# Patient Record
Sex: MALE | Race: OTHER | NOT HISPANIC OR LATINO | URBAN - METROPOLITAN AREA
[De-identification: names, ages, dates, MRNs, and addresses within clinical notes are randomized per-mention and may not be internally consistent; named-entity substitution may affect disease eponyms.]

---

## 2021-07-24 ENCOUNTER — EMERGENCY (EMERGENCY)
Facility: HOSPITAL | Age: 41
LOS: 1 days | Discharge: HOME | End: 2021-07-24
Attending: EMERGENCY MEDICINE
Payer: MEDICAID

## 2021-07-24 VITALS
SYSTOLIC BLOOD PRESSURE: 202 MMHG | TEMPERATURE: 98 F | DIASTOLIC BLOOD PRESSURE: 132 MMHG | WEIGHT: 139.99 LBS | RESPIRATION RATE: 18 BRPM | HEART RATE: 90 BPM | HEIGHT: 77 IN

## 2021-07-24 VITALS
OXYGEN SATURATION: 100 % | HEART RATE: 91 BPM | DIASTOLIC BLOOD PRESSURE: 111 MMHG | RESPIRATION RATE: 16 BRPM | SYSTOLIC BLOOD PRESSURE: 161 MMHG

## 2021-07-24 LAB
ALBUMIN SERPL ELPH-MCNC: 4.8 G/DL — SIGNIFICANT CHANGE UP (ref 3.3–5)
ALP SERPL-CCNC: 86 U/L — SIGNIFICANT CHANGE UP (ref 40–120)
ALT FLD-CCNC: 33 U/L — SIGNIFICANT CHANGE UP (ref 10–45)
ANION GAP SERPL CALC-SCNC: 13 MMOL/L — SIGNIFICANT CHANGE UP (ref 5–17)
AST SERPL-CCNC: 42 U/L — HIGH (ref 10–40)
BASOPHILS # BLD AUTO: 0.04 K/UL — SIGNIFICANT CHANGE UP (ref 0–0.2)
BASOPHILS NFR BLD AUTO: 0.6 % — SIGNIFICANT CHANGE UP (ref 0–2)
BILIRUB SERPL-MCNC: 0.5 MG/DL — SIGNIFICANT CHANGE UP (ref 0.2–1.2)
BUN SERPL-MCNC: 9 MG/DL — SIGNIFICANT CHANGE UP (ref 7–23)
CALCIUM SERPL-MCNC: 10 MG/DL — SIGNIFICANT CHANGE UP (ref 8.4–10.5)
CHLORIDE SERPL-SCNC: 100 MMOL/L — SIGNIFICANT CHANGE UP (ref 96–108)
CO2 SERPL-SCNC: 25 MMOL/L — SIGNIFICANT CHANGE UP (ref 22–31)
CREAT SERPL-MCNC: 0.61 MG/DL — SIGNIFICANT CHANGE UP (ref 0.5–1.3)
EOSINOPHIL # BLD AUTO: 0.04 K/UL — SIGNIFICANT CHANGE UP (ref 0–0.5)
EOSINOPHIL NFR BLD AUTO: 0.6 % — SIGNIFICANT CHANGE UP (ref 0–6)
GLUCOSE SERPL-MCNC: 92 MG/DL — SIGNIFICANT CHANGE UP (ref 70–99)
HCT VFR BLD CALC: 45.7 % — SIGNIFICANT CHANGE UP (ref 39–50)
HGB BLD-MCNC: 15.2 G/DL — SIGNIFICANT CHANGE UP (ref 13–17)
IMM GRANULOCYTES NFR BLD AUTO: 0.3 % — SIGNIFICANT CHANGE UP (ref 0–1.5)
LYMPHOCYTES # BLD AUTO: 1.78 K/UL — SIGNIFICANT CHANGE UP (ref 1–3.3)
LYMPHOCYTES # BLD AUTO: 24.5 % — SIGNIFICANT CHANGE UP (ref 13–44)
MCHC RBC-ENTMCNC: 31.1 PG — SIGNIFICANT CHANGE UP (ref 27–34)
MCHC RBC-ENTMCNC: 33.3 GM/DL — SIGNIFICANT CHANGE UP (ref 32–36)
MCV RBC AUTO: 93.5 FL — SIGNIFICANT CHANGE UP (ref 80–100)
MONOCYTES # BLD AUTO: 0.63 K/UL — SIGNIFICANT CHANGE UP (ref 0–0.9)
MONOCYTES NFR BLD AUTO: 8.7 % — SIGNIFICANT CHANGE UP (ref 2–14)
NEUTROPHILS # BLD AUTO: 4.75 K/UL — SIGNIFICANT CHANGE UP (ref 1.8–7.4)
NEUTROPHILS NFR BLD AUTO: 65.3 % — SIGNIFICANT CHANGE UP (ref 43–77)
NRBC # BLD: 0 /100 WBCS — SIGNIFICANT CHANGE UP (ref 0–0)
PLATELET # BLD AUTO: 207 K/UL — SIGNIFICANT CHANGE UP (ref 150–400)
POTASSIUM SERPL-MCNC: 4 MMOL/L — SIGNIFICANT CHANGE UP (ref 3.5–5.3)
POTASSIUM SERPL-SCNC: 4 MMOL/L — SIGNIFICANT CHANGE UP (ref 3.5–5.3)
PROT SERPL-MCNC: 7.4 G/DL — SIGNIFICANT CHANGE UP (ref 6–8.3)
RBC # BLD: 4.89 M/UL — SIGNIFICANT CHANGE UP (ref 4.2–5.8)
RBC # FLD: 12.4 % — SIGNIFICANT CHANGE UP (ref 10.3–14.5)
SARS-COV-2 RNA SPEC QL NAA+PROBE: SIGNIFICANT CHANGE UP
SODIUM SERPL-SCNC: 138 MMOL/L — SIGNIFICANT CHANGE UP (ref 135–145)
TROPONIN T, HIGH SENSITIVITY RESULT: <6 NG/L — SIGNIFICANT CHANGE UP (ref 0–51)
WBC # BLD: 7.26 K/UL — SIGNIFICANT CHANGE UP (ref 3.8–10.5)
WBC # FLD AUTO: 7.26 K/UL — SIGNIFICANT CHANGE UP (ref 3.8–10.5)

## 2021-07-24 PROCEDURE — 99283 EMERGENCY DEPT VISIT LOW MDM: CPT | Mod: 25

## 2021-07-24 PROCEDURE — 99285 EMERGENCY DEPT VISIT HI MDM: CPT

## 2021-07-24 PROCEDURE — U0003: CPT

## 2021-07-24 PROCEDURE — 85025 COMPLETE CBC W/AUTO DIFF WBC: CPT

## 2021-07-24 PROCEDURE — U0005: CPT

## 2021-07-24 PROCEDURE — 84484 ASSAY OF TROPONIN QUANT: CPT

## 2021-07-24 PROCEDURE — 71045 X-RAY EXAM CHEST 1 VIEW: CPT

## 2021-07-24 PROCEDURE — 84443 ASSAY THYROID STIM HORMONE: CPT

## 2021-07-24 PROCEDURE — 93005 ELECTROCARDIOGRAM TRACING: CPT

## 2021-07-24 PROCEDURE — 80053 COMPREHEN METABOLIC PANEL: CPT

## 2021-07-24 PROCEDURE — 71045 X-RAY EXAM CHEST 1 VIEW: CPT | Mod: 26

## 2021-07-24 RX ORDER — LOSARTAN POTASSIUM 100 MG/1
50 TABLET, FILM COATED ORAL ONCE
Refills: 0 | Status: COMPLETED | OUTPATIENT
Start: 2021-07-24 | End: 2021-07-24

## 2021-07-24 RX ORDER — LOSARTAN POTASSIUM 100 MG/1
1 TABLET, FILM COATED ORAL
Qty: 14 | Refills: 0
Start: 2021-07-24 | End: 2021-08-06

## 2021-07-24 RX ADMIN — LOSARTAN POTASSIUM 50 MILLIGRAM(S): 100 TABLET, FILM COATED ORAL at 14:18

## 2021-07-24 NOTE — ED PROVIDER NOTE - CARE PROVIDER_API CALL
Angel Canales)  Cardiology Medicine  [address] [facility], [place] [address]  Phone: [phone]  Fax: [phone]  Follow Up Time: 7-10 Days

## 2021-07-24 NOTE — ED PROVIDER NOTE - PHYSICAL EXAMINATION
GENERAL: NAD, lying in bed comfortably  HEAD:  Atraumatic, Normocephalic  EYES: conjunctiva and sclera clear  ENT: Moist mucous membranes  NECK: Supple, No JVD  CHEST/LUNG: Clear to auscultation bilaterally; No rales, rhonchi, wheezing, or rubs. Unlabored respirations  HEART: Regular rate and rhythm; No murmurs, rubs, or gallops  ABDOMEN: BSx4; Soft, nontender, nondistended  EXTREMITIES: no edema  NERVOUS SYSTEM:  A&Ox3, no focal deficits   SKIN: No rashes or lesions  Psych: Normal speech, normal behavior, normal affect

## 2021-07-24 NOTE — ED PROVIDER NOTE - CLINICAL SUMMARY MEDICAL DECISION MAKING FREE TEXT BOX
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 EKG with RBBB. Pt asymptomatic. 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 EKG with RBBB. Pt asymptomatic. Low suspicion for acs, will check basics, trop, XR, UA; and reasses ZR

## 2021-07-24 NOTE — ED PROVIDER NOTE - OBJECTIVE STATEMENT
40M active smoker (1ppd), PMH HTN, sent in by o 40M active smoker (1ppd), PMH HTN, sent in by outpatient primary care physician assistant for concern for abnormal EKG. Pt reports he went to PCP office for labs as he "wanted to get his labs checked" because he had just gotten new health insurance. Denied any complaints, had an EKG done where abnormal findings of RBBB were found and he was advised to go to ED. Denies any CP, SOB, abd pain, n/v, decreased exercise tolerance. Feels normal. Was started on losartan 50qd 1month ago, admits to missing 2-4 doses/week, ran out of his script 1 week ago, has not taken for 1 week. States never told in past he had abnormal ECG findings.     PCP: Dr. Pranay CINTRON  [phone]

## 2021-07-24 NOTE — ED PROVIDER NOTE - NSFOLLOWUPINSTRUCTIONS_ED_ALL_ED_FT
You came in from your outpatient office due to concern for EKG findings. You have a right bundle branch block. This was most likely an incidental finding. You had no symptoms of chest pain or shortness of breath. You had a chest xray which showed clear lungs. Labs were unremarkable. Follow up with cardiologist Dr. Angel Canales which we have provided information for you as below. Alternative contact information for Dr. Canales: Tel [phone], [phone], [address], [place] [address].    Your blood pressure is not well-controlled. Take your losartan 50 daily as prescribed. A script for 2 weeks has been sent to your pharmacy. Follow up with your PCP for management of your blood pressure within 2 weeks.

## 2021-07-24 NOTE — ED PROVIDER NOTE - PATIENT PORTAL LINK FT
You can access the FollowMyHealth Patient Portal offered by [facility] by registering at the following website: [email or web address]. By joining [facility] [employer]’s FollowMyHealth portal, you will also be able to view your health information using other applications (apps) compatible with our system.

## 2021-07-24 NOTE — ED PROVIDER NOTE - PROGRESS NOTE DETAILS
PT has no complaints ,looks good ,given follow up with couple Italian speaking  cardiologist on [place] [facility]

## 2021-07-25 LAB — TSH SERPL-MCNC: 1.2 UIU/ML — SIGNIFICANT CHANGE UP (ref 0.27–4.2)

## 2024-02-14 NOTE — ED PROVIDER NOTE - CHIEF COMPLAINT

## 2024-03-08 ENCOUNTER — NON-APPOINTMENT (OUTPATIENT)
Age: 44
End: 2024-03-08

## 2024-03-08 ENCOUNTER — APPOINTMENT (OUTPATIENT)
Dept: CARDIOLOGY | Facility: CLINIC | Age: 44
End: 2024-03-08
Payer: MEDICAID

## 2024-03-08 VITALS
BODY MASS INDEX: 22.13 KG/M2 | WEIGHT: 146 LBS | SYSTOLIC BLOOD PRESSURE: 164 MMHG | DIASTOLIC BLOOD PRESSURE: 100 MMHG | OXYGEN SATURATION: 98 % | HEIGHT: 68 IN | HEART RATE: 63 BPM

## 2024-03-08 VITALS — DIASTOLIC BLOOD PRESSURE: 108 MMHG | SYSTOLIC BLOOD PRESSURE: 160 MMHG

## 2024-03-08 DIAGNOSIS — I10 ESSENTIAL (PRIMARY) HYPERTENSION: ICD-10-CM

## 2024-03-08 DIAGNOSIS — F17.210 NICOTINE DEPENDENCE, CIGARETTES, UNCOMPLICATED: ICD-10-CM

## 2024-03-08 DIAGNOSIS — R09.89 OTHER SPECIFIED SYMPTOMS AND SIGNS INVOLVING THE CIRCULATORY AND RESPIRATORY SYSTEMS: ICD-10-CM

## 2024-03-08 DIAGNOSIS — Z86.79 PERSONAL HISTORY OF OTHER DISEASES OF THE CIRCULATORY SYSTEM: ICD-10-CM

## 2024-03-08 DIAGNOSIS — Z82.49 FAMILY HISTORY OF ISCHEMIC HEART DISEASE AND OTHER DISEASES OF THE CIRCULATORY SYSTEM: ICD-10-CM

## 2024-03-08 PROBLEM — Z00.00 ENCOUNTER FOR PREVENTIVE HEALTH EXAMINATION: Status: ACTIVE | Noted: 2024-03-08

## 2024-03-08 PROCEDURE — 93000 ELECTROCARDIOGRAM COMPLETE: CPT

## 2024-03-08 PROCEDURE — G2211 COMPLEX E/M VISIT ADD ON: CPT | Mod: NC,1L

## 2024-03-08 PROCEDURE — 36415 COLL VENOUS BLD VENIPUNCTURE: CPT

## 2024-03-08 PROCEDURE — 99204 OFFICE O/P NEW MOD 45 MIN: CPT

## 2024-03-08 RX ORDER — AMLODIPINE BESYLATE 5 MG/1
5 TABLET ORAL DAILY
Qty: 90 | Refills: 3 | Status: ACTIVE | COMMUNITY
Start: 2024-03-08 | End: 1900-01-01

## 2024-03-08 NOTE — DISCUSSION/SUMMARY
[FreeTextEntry1] : Goals of treatment discussed. Advised lifestyle modification, discussed low sodium diet, exercise, caffeine reduction. Add/rx amlodipine 5 mg QD. Contin labetalol. Stop hydralazine he is not taking anyway. Sched echo, carotid duplex. Labs Follow up 2 weeks.  [EKG obtained to assist in diagnosis and management of assessed problem(s)] : EKG obtained to assist in diagnosis and management of assessed problem(s)

## 2024-03-08 NOTE — PHYSICAL EXAM
[Well Developed] : well developed [Well Nourished] : well nourished [No Acute Distress] : no acute distress [Normal Conjunctiva] : normal conjunctiva [Normal Venous Pressure] : normal venous pressure [Normal S1, S2] : normal S1, S2 [No Murmur] : no murmur [No Rub] : no rub [No Gallop] : no gallop [Clear Lung Fields] : clear lung fields [No Respiratory Distress] : no respiratory distress  [Good Air Entry] : good air entry [Non Tender] : non-tender [Soft] : abdomen soft [No Masses/organomegaly] : no masses/organomegaly [Normal Bowel Sounds] : normal bowel sounds [No Edema] : no edema [Normal Gait] : normal gait [No Cyanosis] : no cyanosis [No Clubbing] : no clubbing [No Varicosities] : no varicosities [No Skin Lesions] : no skin lesions [No Rash] : no rash [Moves all extremities] : moves all extremities [No Focal Deficits] : no focal deficits [Normal Speech] : normal speech [Normal memory] : normal memory [Alert and Oriented] : alert and oriented [Carotid Bruit] : carotid bruit [de-identified] : + right

## 2024-03-08 NOTE — HISTORY OF PRESENT ILLNESS
[FreeTextEntry1] : LALO ROGEL is a 43 year old male self referred for eval of uncontrolled HTN. Recent BP at home 140s-160s/90s-100s. HTN dx in 2021. Had been started on losartan, amlodipine, hydralazine, BB, with varying degrees of compliance and tolerance. Currently taking labetalol TID but not taking hydralazine regularly. Not exercising. + left upper chest discomfort sporadic, rare about once per year assoc with emotional stress, last time was few days ago. No SOB. No palps. No HA.  He had dizziness when he took one dose of HCTZ 25 mg, otherwise no dizziness. No lower ext edema. Reports having an echo a few years ago that showed some type of abnormality.  Soc - + smoking 6-7 cigs per day.  Occas alcohol.  No illicit drugs.  + coffee 2 cups per day. Diet is plentiful in salt. Fam hist - Father - HTN, OHS for valve disease.  Mother - HTN.

## 2024-03-11 LAB
ALBUMIN SERPL ELPH-MCNC: 4.8 G/DL
ALDOSTERONE SERUM: 5 NG/DL
ALP BLD-CCNC: 78 U/L
ALT SERPL-CCNC: 28 U/L
ANION GAP SERPL CALC-SCNC: 14 MMOL/L
AST SERPL-CCNC: 41 U/L
BILIRUB SERPL-MCNC: 0.8 MG/DL
BUN SERPL-MCNC: 12 MG/DL
CALCIUM SERPL-MCNC: 9.4 MG/DL
CHLORIDE SERPL-SCNC: 103 MMOL/L
CHOLEST SERPL-MCNC: 196 MG/DL
CO2 SERPL-SCNC: 24 MMOL/L
CREAT SERPL-MCNC: 0.64 MG/DL
EGFR: 121 ML/MIN/1.73M2
GLUCOSE SERPL-MCNC: 87 MG/DL
HCT VFR BLD CALC: 46.6 %
HDLC SERPL-MCNC: 72 MG/DL
HGB BLD-MCNC: 15.2 G/DL
LDLC SERPL CALC-MCNC: 81 MG/DL
MCHC RBC-ENTMCNC: 31.3 PG
MCHC RBC-ENTMCNC: 32.6 GM/DL
MCV RBC AUTO: 95.9 FL
NONHDLC SERPL-MCNC: 123 MG/DL
PLATELET # BLD AUTO: 266 K/UL
POTASSIUM SERPL-SCNC: 4.7 MMOL/L
PROT SERPL-MCNC: 7.4 G/DL
RBC # BLD: 4.86 M/UL
RBC # FLD: 12.7 %
SODIUM SERPL-SCNC: 141 MMOL/L
TRIGL SERPL-MCNC: 266 MG/DL
TSH SERPL-ACNC: 0.63 UIU/ML
WBC # FLD AUTO: 5.7 K/UL

## 2024-03-15 LAB — RENIN ACTIVITY, PLASMA: 0.44 NG/ML/HR

## 2024-03-18 LAB
METANEPHRINE, PL: <25 PG/ML
NORMETANEPHRINE, PL: 66.6 PG/ML

## 2024-03-29 ENCOUNTER — APPOINTMENT (OUTPATIENT)
Dept: CARDIOLOGY | Facility: CLINIC | Age: 44
End: 2024-03-29
Payer: MEDICAID

## 2024-03-29 VITALS
HEART RATE: 84 BPM | HEIGHT: 68 IN | SYSTOLIC BLOOD PRESSURE: 158 MMHG | OXYGEN SATURATION: 98 % | WEIGHT: 147 LBS | DIASTOLIC BLOOD PRESSURE: 110 MMHG | BODY MASS INDEX: 22.28 KG/M2

## 2024-03-29 VITALS — DIASTOLIC BLOOD PRESSURE: 100 MMHG | SYSTOLIC BLOOD PRESSURE: 160 MMHG

## 2024-03-29 DIAGNOSIS — I51.7 CARDIOMEGALY: ICD-10-CM

## 2024-03-29 DIAGNOSIS — R29.818 OTHER SYMPTOMS AND SIGNS INVOLVING THE NERVOUS SYSTEM: ICD-10-CM

## 2024-03-29 PROCEDURE — 99214 OFFICE O/P EST MOD 30 MIN: CPT | Mod: 25

## 2024-03-29 PROCEDURE — G2211 COMPLEX E/M VISIT ADD ON: CPT | Mod: NC,1L

## 2024-03-29 PROCEDURE — 93306 TTE W/DOPPLER COMPLETE: CPT

## 2024-03-29 PROCEDURE — 93880 EXTRACRANIAL BILAT STUDY: CPT

## 2024-03-29 RX ORDER — LOSARTAN POTASSIUM 50 MG/1
50 TABLET, FILM COATED ORAL
Qty: 90 | Refills: 3 | Status: ACTIVE | COMMUNITY
Start: 2024-03-29 | End: 1900-01-01

## 2024-03-29 NOTE — HISTORY OF PRESENT ILLNESS
[FreeTextEntry1] : Here for follow up. Prelim echo results reviewed with patient. Home BP has been in 120s-140s/80s-103 lately. No new complaints, but reports he tried inc Amlodipine to 10 mg but developed severe sleepiness. + snoring.  Labs reviewed. He requests alternative to Labetalol as he has diff taking it TID.

## 2024-03-29 NOTE — DISCUSSION/SUMMARY
[FreeTextEntry1] : Advised smoking cessation. Contin monitoring home BP. Change Labetalol to Nebivolol 5 mg QD. Add/rx Losartan 50 mg QD. Sched sleep study to eval for LOPEZ. Check echo. Follow up 1 mo

## 2024-05-03 ENCOUNTER — APPOINTMENT (OUTPATIENT)
Dept: CARDIOLOGY | Facility: CLINIC | Age: 44
End: 2024-05-03

## 2024-05-24 ENCOUNTER — APPOINTMENT (OUTPATIENT)
Dept: CARDIOLOGY | Facility: CLINIC | Age: 44
End: 2024-05-24
Payer: MEDICAID

## 2024-05-24 VITALS — DIASTOLIC BLOOD PRESSURE: 88 MMHG | SYSTOLIC BLOOD PRESSURE: 130 MMHG

## 2024-05-24 VITALS
DIASTOLIC BLOOD PRESSURE: 100 MMHG | HEIGHT: 68 IN | HEART RATE: 79 BPM | SYSTOLIC BLOOD PRESSURE: 132 MMHG | BODY MASS INDEX: 21.98 KG/M2 | WEIGHT: 145 LBS | OXYGEN SATURATION: 98 %

## 2024-05-24 DIAGNOSIS — I10 ESSENTIAL (PRIMARY) HYPERTENSION: ICD-10-CM

## 2024-05-24 PROCEDURE — 95800 SLP STDY UNATTENDED: CPT | Mod: TC

## 2024-05-24 PROCEDURE — 99213 OFFICE O/P EST LOW 20 MIN: CPT

## 2024-05-24 RX ORDER — HYDRALAZINE HYDROCHLORIDE 50 MG/1
50 TABLET ORAL
Refills: 0 | Status: DISCONTINUED | COMMUNITY
End: 2024-05-24

## 2024-05-24 RX ORDER — LABETALOL HYDROCHLORIDE 200 MG/1
200 TABLET, FILM COATED ORAL
Refills: 0 | Status: DISCONTINUED | COMMUNITY
End: 2024-05-24

## 2024-05-24 RX ORDER — LOSARTAN POTASSIUM 100 MG/1
100 TABLET, FILM COATED ORAL DAILY
Qty: 90 | Refills: 1 | Status: ACTIVE | COMMUNITY
Start: 2024-05-24 | End: 1900-01-01

## 2024-05-24 NOTE — HISTORY OF PRESENT ILLNESS
[FreeTextEntry1] : BP check. No new complaints. Feels OK. BP at home still elevated mainly 120s/90s.

## 2024-06-11 ENCOUNTER — NON-APPOINTMENT (OUTPATIENT)
Age: 44
End: 2024-06-11

## 2024-06-17 RX ORDER — NEBIVOLOL 5 MG/1
5 TABLET ORAL
Qty: 90 | Refills: 2 | Status: ACTIVE | COMMUNITY
Start: 2024-03-29 | End: 1900-01-01

## 2024-07-12 ENCOUNTER — APPOINTMENT (OUTPATIENT)
Dept: CARDIOLOGY | Facility: CLINIC | Age: 44
End: 2024-07-12

## 2024-09-16 ENCOUNTER — APPOINTMENT (OUTPATIENT)
Dept: CARDIOLOGY | Facility: CLINIC | Age: 44
End: 2024-09-16